# Patient Record
Sex: MALE | URBAN - METROPOLITAN AREA
[De-identification: names, ages, dates, MRNs, and addresses within clinical notes are randomized per-mention and may not be internally consistent; named-entity substitution may affect disease eponyms.]

---

## 2022-10-26 ENCOUNTER — APPOINTMENT (OUTPATIENT)
Dept: URBAN - METROPOLITAN AREA CLINIC 263 | Age: 37
Setting detail: DERMATOLOGY
End: 2022-10-27

## 2022-10-26 PROBLEM — D23.39 OTHER BENIGN NEOPLASM OF SKIN OF OTHER PARTS OF FACE: Status: ACTIVE | Noted: 2022-10-26

## 2022-10-26 PROCEDURE — 99202 OFFICE O/P NEW SF 15 MIN: CPT

## 2022-10-26 PROCEDURE — OTHER ADDITIONAL NOTES: OTHER

## 2022-10-26 PROCEDURE — OTHER MIPS QUALITY: OTHER

## 2022-10-26 PROCEDURE — OTHER COUNSELING: OTHER

## 2022-10-26 NOTE — PROCEDURE: ADDITIONAL NOTES
Additional Notes: Provider advised patient laser treatment can treat lesions. Recommended [facility]. Additional Notes: Provider advised patient laser treatment can treat lesions. Recommended [facility].

## 2022-10-26 NOTE — HPI: SKIN LESION
What Type Of Note Output Would You Prefer (Optional)?: Bullet Format
How Severe Is Your Skin Lesion?: mild
Has Your Skin Lesion Been Treated?: not been treated
Is This A New Presentation, Or A Follow-Up?: Skin Lesions
Additional History: Declined fbse. [facility] ok